# Patient Record
Sex: FEMALE | Race: WHITE | URBAN - METROPOLITAN AREA
[De-identification: names, ages, dates, MRNs, and addresses within clinical notes are randomized per-mention and may not be internally consistent; named-entity substitution may affect disease eponyms.]

---

## 2019-01-30 ENCOUNTER — OFFICE VISIT (OUTPATIENT)
Dept: FAMILY MEDICINE CLINIC | Age: 29
End: 2019-01-30
Payer: COMMERCIAL

## 2019-01-30 VITALS
OXYGEN SATURATION: 99 % | TEMPERATURE: 98.2 F | DIASTOLIC BLOOD PRESSURE: 68 MMHG | BODY MASS INDEX: 33.41 KG/M2 | SYSTOLIC BLOOD PRESSURE: 122 MMHG | WEIGHT: 170.2 LBS | HEART RATE: 82 BPM | HEIGHT: 60 IN

## 2019-01-30 DIAGNOSIS — N92.1 MENORRHAGIA WITH IRREGULAR CYCLE: ICD-10-CM

## 2019-01-30 DIAGNOSIS — N92.6 IRREGULAR PERIODS/MENSTRUAL CYCLES: Primary | ICD-10-CM

## 2019-01-30 DIAGNOSIS — E66.9 CLASS 1 OBESITY WITHOUT SERIOUS COMORBIDITY WITH BODY MASS INDEX (BMI) OF 33.0 TO 33.9 IN ADULT, UNSPECIFIED OBESITY TYPE: ICD-10-CM

## 2019-01-30 LAB
ANION GAP SERPL CALCULATED.3IONS-SCNC: 13 MMOL/L (ref 3–16)
BASOPHILS ABSOLUTE: 0 K/UL (ref 0–0.2)
BASOPHILS RELATIVE PERCENT: 0.4 %
BUN BLDV-MCNC: 11 MG/DL (ref 7–20)
CALCIUM SERPL-MCNC: 9.3 MG/DL (ref 8.3–10.6)
CHLORIDE BLD-SCNC: 101 MMOL/L (ref 99–110)
CO2: 25 MMOL/L (ref 21–32)
CREAT SERPL-MCNC: 0.9 MG/DL (ref 0.6–1.1)
EOSINOPHILS ABSOLUTE: 0.1 K/UL (ref 0–0.6)
EOSINOPHILS RELATIVE PERCENT: 1.9 %
GFR AFRICAN AMERICAN: >60
GFR NON-AFRICAN AMERICAN: >60
GLUCOSE BLD-MCNC: 90 MG/DL (ref 70–99)
HCT VFR BLD CALC: 41.3 % (ref 36–48)
HEMOGLOBIN: 13.7 G/DL (ref 12–16)
LYMPHOCYTES ABSOLUTE: 2.4 K/UL (ref 1–5.1)
LYMPHOCYTES RELATIVE PERCENT: 34.8 %
MCH RBC QN AUTO: 28.5 PG (ref 26–34)
MCHC RBC AUTO-ENTMCNC: 33.2 G/DL (ref 31–36)
MCV RBC AUTO: 85.8 FL (ref 80–100)
MONOCYTES ABSOLUTE: 0.5 K/UL (ref 0–1.3)
MONOCYTES RELATIVE PERCENT: 7 %
NEUTROPHILS ABSOLUTE: 3.8 K/UL (ref 1.7–7.7)
NEUTROPHILS RELATIVE PERCENT: 55.9 %
PDW BLD-RTO: 13.2 % (ref 12.4–15.4)
PLATELET # BLD: 280 K/UL (ref 135–450)
PMV BLD AUTO: 8.4 FL (ref 5–10.5)
POTASSIUM SERPL-SCNC: 4.3 MMOL/L (ref 3.5–5.1)
RBC # BLD: 4.82 M/UL (ref 4–5.2)
SODIUM BLD-SCNC: 139 MMOL/L (ref 136–145)
TSH REFLEX: 1.42 UIU/ML (ref 0.27–4.2)
WBC # BLD: 6.9 K/UL (ref 4–11)

## 2019-01-30 PROCEDURE — 99203 OFFICE O/P NEW LOW 30 MIN: CPT | Performed by: FAMILY MEDICINE

## 2019-01-30 PROCEDURE — 36415 COLL VENOUS BLD VENIPUNCTURE: CPT | Performed by: FAMILY MEDICINE

## 2019-01-30 ASSESSMENT — PATIENT HEALTH QUESTIONNAIRE - PHQ9
2. FEELING DOWN, DEPRESSED OR HOPELESS: 0
1. LITTLE INTEREST OR PLEASURE IN DOING THINGS: 0
SUM OF ALL RESPONSES TO PHQ9 QUESTIONS 1 & 2: 0
SUM OF ALL RESPONSES TO PHQ QUESTIONS 1-9: 0
SUM OF ALL RESPONSES TO PHQ QUESTIONS 1-9: 0

## 2019-01-30 ASSESSMENT — ENCOUNTER SYMPTOMS
EYE PAIN: 0
ABDOMINAL PAIN: 0
EYE DISCHARGE: 0
WHEEZING: 0
CHEST TIGHTNESS: 0
VOMITING: 0
EYE REDNESS: 0
SHORTNESS OF BREATH: 0
DIARRHEA: 0
RHINORRHEA: 0
BLOOD IN STOOL: 0
COUGH: 0
CONSTIPATION: 0
NAUSEA: 0
SINUS PRESSURE: 0

## 2019-02-01 ENCOUNTER — OFFICE VISIT (OUTPATIENT)
Dept: OBGYN CLINIC | Age: 29
End: 2019-02-01
Payer: COMMERCIAL

## 2019-02-01 VITALS
HEART RATE: 95 BPM | TEMPERATURE: 97.5 F | HEIGHT: 59 IN | SYSTOLIC BLOOD PRESSURE: 118 MMHG | DIASTOLIC BLOOD PRESSURE: 72 MMHG | WEIGHT: 176 LBS | BODY MASS INDEX: 35.48 KG/M2

## 2019-02-01 DIAGNOSIS — N92.6 IRREGULAR MENSES: ICD-10-CM

## 2019-02-01 DIAGNOSIS — Z01.419 ENCOUNTER FOR ANNUAL ROUTINE GYNECOLOGICAL EXAMINATION: Primary | ICD-10-CM

## 2019-02-01 DIAGNOSIS — Z12.4 PAP SMEAR FOR CERVICAL CANCER SCREENING: ICD-10-CM

## 2019-02-01 PROBLEM — B01.89 VARICELLA WITH COMPLICATIONS: Status: ACTIVE | Noted: 2019-02-01

## 2019-02-01 PROCEDURE — 99385 PREV VISIT NEW AGE 18-39: CPT | Performed by: OBSTETRICS & GYNECOLOGY

## 2019-02-01 PROCEDURE — 36415 COLL VENOUS BLD VENIPUNCTURE: CPT | Performed by: OBSTETRICS & GYNECOLOGY

## 2019-02-01 RX ORDER — NORETHINDRONE ACETATE AND ETHINYL ESTRADIOL .03; 1.5 MG/1; MG/1
1 TABLET ORAL DAILY
Qty: 21 TABLET | Refills: 2 | Status: SHIPPED | OUTPATIENT
Start: 2019-02-01 | End: 2019-11-14

## 2019-02-01 ASSESSMENT — ENCOUNTER SYMPTOMS
DIARRHEA: 0
CHEST TIGHTNESS: 0
VOMITING: 0
ABDOMINAL PAIN: 0
CONSTIPATION: 0
NAUSEA: 0
SHORTNESS OF BREATH: 0

## 2019-02-02 LAB — PROLACTIN: 30.7 NG/ML

## 2019-02-05 LAB
SEX HORMONE BINDING GLOBULIN: 36 NMOL/L (ref 30–135)
TESTOSTERONE FREE-NONMALE: 3.9 PG/ML (ref 0.8–7.4)
TESTOSTERONE TOTAL: 23 NG/DL (ref 20–70)

## 2019-02-06 LAB — 17-OH PROGESTERONE LCMS: 219.73 NG/DL

## 2019-10-11 ENCOUNTER — TELEPHONE (OUTPATIENT)
Dept: FAMILY MEDICINE CLINIC | Age: 29
End: 2019-10-11
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
## 2019-11-14 ENCOUNTER — OFFICE VISIT (OUTPATIENT)
Dept: FAMILY MEDICINE CLINIC | Age: 29
End: 2019-11-14
Payer: COMMERCIAL

## 2019-11-14 VITALS
HEIGHT: 59 IN | TEMPERATURE: 102.8 F | RESPIRATION RATE: 22 BRPM | BODY MASS INDEX: 35.44 KG/M2 | WEIGHT: 175.8 LBS | HEART RATE: 117 BPM | OXYGEN SATURATION: 98 % | DIASTOLIC BLOOD PRESSURE: 68 MMHG | SYSTOLIC BLOOD PRESSURE: 122 MMHG

## 2019-11-14 DIAGNOSIS — J10.1 INFLUENZA B: Primary | ICD-10-CM

## 2019-11-14 DIAGNOSIS — R68.89 FLU-LIKE SYMPTOMS: ICD-10-CM

## 2019-11-14 LAB
INFLUENZA A ANTIGEN, POC: NEGATIVE
INFLUENZA B ANTIGEN, POC: POSITIVE

## 2019-11-14 PROCEDURE — 87804 INFLUENZA ASSAY W/OPTIC: CPT | Performed by: PHYSICIAN ASSISTANT

## 2019-11-14 PROCEDURE — 99213 OFFICE O/P EST LOW 20 MIN: CPT | Performed by: PHYSICIAN ASSISTANT

## 2020-02-21 ENCOUNTER — OFFICE VISIT (OUTPATIENT)
Dept: OBGYN CLINIC | Age: 30
End: 2020-02-21
Payer: COMMERCIAL

## 2020-02-21 VITALS
RESPIRATION RATE: 16 BRPM | BODY MASS INDEX: 35.8 KG/M2 | DIASTOLIC BLOOD PRESSURE: 86 MMHG | HEART RATE: 101 BPM | WEIGHT: 177.6 LBS | SYSTOLIC BLOOD PRESSURE: 138 MMHG | HEIGHT: 59 IN

## 2020-02-21 PROCEDURE — 99385 PREV VISIT NEW AGE 18-39: CPT | Performed by: OBSTETRICS & GYNECOLOGY

## 2020-02-21 RX ORDER — NORGESTIMATE AND ETHINYL ESTRADIOL 7DAYSX3 28
1 KIT ORAL DAILY
Qty: 28 TABLET | Refills: 3 | Status: SHIPPED | OUTPATIENT
Start: 2020-02-21 | End: 2020-05-20

## 2020-02-21 ASSESSMENT — PATIENT HEALTH QUESTIONNAIRE - PHQ9
SUM OF ALL RESPONSES TO PHQ9 QUESTIONS 1 & 2: 0
SUM OF ALL RESPONSES TO PHQ QUESTIONS 1-9: 0
2. FEELING DOWN, DEPRESSED OR HOPELESS: 0
1. LITTLE INTEREST OR PLEASURE IN DOING THINGS: 0
SUM OF ALL RESPONSES TO PHQ QUESTIONS 1-9: 0

## 2020-02-21 NOTE — PROGRESS NOTES
[facility]   Annual Exam     CC:   Chief Complaint   Patient presents with    Gynecologic Exam    Discuss Medications     BC       HPI:  30 y.o. Viktoria Fischer presents for her gynecologic annual exam. Does admit to hx of OCP use, did not use for long due to very heavy periods (bleed at least for 3 months, whole time she was using). Health Maintenance:  Safe Enviroment: Y   Healthy diet: Y  Exercise: N   Sexually Active: Y    Denies sexual problems  Contraception: Condoms    - interested in birth control     Screening:  Last pap smear:  (Irais Eisenberg)    - has only had the one   History of abnormal pap smears: Denies   STI History: Denies      Vaccines:  Gardasil vaccine: Unsure, thinks so    Flu vaccine: N     Review of Systems:   Constitutional: Negative for appetite change, chills and fever. HENT: Negative for congestion, sneezing and sore throat. Eyes: Negative for visual disturbance. Respiratory: Negative for chest tightness, shortness of breath and wheezing. Cardiovascular: Negative for chest pain, palpitations and leg swelling. Gastrointestinal: Negative for abdominal pain, diarrhea, nausea and vomiting. Endocrine: Negative for heat intolerance. Genitourinary: Negative for difficulty urinating, dyspareunia, dysuria, menstrual problem and pelvic pain. Musculoskeletal: Negative for back pain, neck pain and neck stiffness. Skin: Negative for color change, pallor and rash. Allergic/Immunologic: Negative for environmental allergies, food allergies and immunocompromised state. Neurological: Negative for light-headedness, numbness and headaches. Hematological: Does not bruise/bleed easily. Psychiatric/Behavioral: Negative for behavioral problems, sleep disturbance and suicidal ideas.      Primary Care Physician: Frankey Bing BHATT, DO    Obstetric History  OB History    Para Term  AB Living   0 0 0 0 0 0   SAB TAB Ectopic Molar Multiple Live Births   0 0 0 0 0 0 Gynecologic History  Menstrual History:   LMP: Patient's last menstrual period was 02/09/2020 (approximate).  Menstrual Period: regular  o Has gotten more regular in past year   o Weight loss over    Interval Between Menses: 30 days    Duration of Menses: 2-3 days    Menstrual Flow: Very heavy then light    Bleeding between menses: rare, spotting between     MedicalHistory:  Past Medical History:   Diagnosis Date    Irregular periods     since the start of periods at age 16       Medications:  Current Outpatient Medications   Medication Sig Dispense Refill    Norgestim-Eth Estrad Triphasic (TRI-SPRINTEC) 0.18/0.215/0.25 MG-35 MCG TABS Take 1 tablet by mouth daily 28 tablet 3     No current facility-administered medications for this visit. Surgical History:  Past Surgical History:   Procedure Laterality Date    MOUTH SURGERY      took skin from top of mouth and placed on bottom gums       Allergies: Allergies   Allergen Reactions    Amoxicillin      HIVES    Penicillins Hives       Family History:  Family History   Problem Relation Age of Onset    High Blood Pressure Mother     Diabetes Mother         Type II    High Blood Pressure Father     Diabetes Father     High Blood Pressure Sister        Denies personal/family history of cervical, uterine, ovarian, vulvar, breast, or colon cancers. Mom had a hx with infertility      Social History:  Social History     Socioeconomic History    Marital status: Single     Spouse name: None    Number of children: None    Years of education: None    Highest education level: None   Occupational History    None   Social Needs    Financial resource strain: None    Food insecurity:     Worry: None     Inability: None    Transportation needs:     Medical: None     Non-medical: None   Tobacco Use    Smoking status: Never Smoker    Smokeless tobacco: Never Used   Substance and Sexual Activity    Alcohol use: Yes     Comment: occ    Drug use:  No  Sexual activity: Not Currently     Comment: never has had sex 2/1/19   Lifestyle    Physical activity:     Days per week: None     Minutes per session: None    Stress: None   Relationships    Social connections:     Talks on phone: None     Gets together: None     Attends Scientologist service: None     Active member of club or organization: None     Attends meetings of clubs or organizations: None     Relationship status: None    Intimate partner violence:     Fear of current or ex partner: None     Emotionally abused: None     Physically abused: None     Forced sexual activity: None   Other Topics Concern    None   Social History Narrative    None       Objective: Body mass index is 35.85 kg/m².   /86 (Site: Left Upper Arm, Position: Sitting, Cuff Size: Medium Adult)   Pulse 101   Resp 16   Ht 4' 11.02\" (1.499 m)   Wt 177 lb 9.6 oz (80.6 kg)   LMP 02/09/2020 (Approximate)   BMI 35.85 kg/m²     Exam:   General: Alert, well appearing, no acute distress  Head: Normocephalic, atraumatic  Mouth: Mucous membranes moist, pharynx normal without lesions  Thyroid: No thyromegaly or masses present   Breasts: Symmetric, non-tender to palpation, no skin changes, palpable axillary lymph nodes or masses noted  Lungs: Respiratory effort within normal limits   CV: Regular rate   Abdomen: Soft, nontender, nondistended   Pelvic:   External: Normal appearing genitalia without masses, tenderness or lesions  Vagina: moist, pink mucosa, without abnormal discharge or lesions  Cervix: no masses or lesions visualized, no cervical motion tenderness  Uterus: mobile, midline, no masses palpated  Adnexa: mobile, non-tender to palpation, without masses  Rectovaginal: deferred  Extremities: No redness or tenderness, neg Hesham's sign  Skin: Well perfused, normal coloration and turgor, no lesions or rashes visualized  Neuro: Alert, oriented, normal speech, no focal deficits, moves extremities appropriately  Osteopathic: No TART changes    Assessment/Plan:  30 y.o. Miguel Angel Gomez presenting for her annual exam:     Diagnosis Orders   1. Encounter for annual routine gynecological examination     2. Encounter for initial prescription of contraceptive pills     3. Irregular menses  Norgestim-Eth Estrad Triphasic (TRI-SPRINTEC) 0.18/0.215/0.25 MG-35 MCG TABS   4. Screening breast examination     5. Vaginal discharge  VAGINAL PATHOGENS PROBE *A    C.trachomatis N.gonorrhoeae DNA       Annual Visit Recommendations:   Clinical breast exam was done and self-breast exam and self-breast awareness reviewed. Pelvic exam was done and ASCCP guidelines reviewed   Reviewed healthy diet and daily multivitamin use. Reviewed recommendations on Calcium and Vitamin D intake. Discussed seatbelt use. Follow Up  - Will call patient with results   -Return in about 3 months (around 5/21/2020) for Recheck (OCP check).     Therese Evans DO

## 2020-02-22 LAB
CANDIDA SPECIES, DNA PROBE: NORMAL
GARDNERELLA VAGINALIS, DNA PROBE: NORMAL
TRICHOMONAS VAGINALIS DNA: NORMAL

## 2020-02-24 LAB
C TRACH DNA GENITAL QL NAA+PROBE: NEGATIVE
N. GONORRHOEAE DNA: NEGATIVE

## 2020-05-19 ENCOUNTER — TELEPHONE (OUTPATIENT)
Dept: OBGYN CLINIC | Age: 30
End: 2020-05-19

## 2020-05-20 RX ORDER — NORGESTIMATE AND ETHINYL ESTRADIOL 7DAYSX3 28
1 KIT ORAL DAILY
Qty: 28 TABLET | Refills: 1 | Status: SHIPPED | OUTPATIENT
Start: 2020-05-20 | End: 2020-08-17 | Stop reason: SDUPTHER

## 2020-05-20 NOTE — TELEPHONE ENCOUNTER
Rx signed for additional refill. Can we find out if patient has a way to check blood pressure at home? If so and she can check and let us know will not need in office OCP check at this time. Thanks.

## 2020-05-26 ENCOUNTER — NURSE ONLY (OUTPATIENT)
Dept: OBGYN CLINIC | Age: 30
End: 2020-05-26

## 2020-05-26 ENCOUNTER — TELEPHONE (OUTPATIENT)
Dept: OBGYN CLINIC | Age: 30
End: 2020-05-26

## 2020-05-26 VITALS — DIASTOLIC BLOOD PRESSURE: 78 MMHG | SYSTOLIC BLOOD PRESSURE: 121 MMHG | HEART RATE: 76 BPM

## 2020-08-17 ENCOUNTER — OFFICE VISIT (OUTPATIENT)
Dept: OBGYN CLINIC | Age: 30
End: 2020-08-17
Payer: COMMERCIAL

## 2020-08-17 VITALS
HEART RATE: 82 BPM | DIASTOLIC BLOOD PRESSURE: 84 MMHG | SYSTOLIC BLOOD PRESSURE: 124 MMHG | TEMPERATURE: 98.4 F | WEIGHT: 181 LBS | BODY MASS INDEX: 36.54 KG/M2

## 2020-08-17 PROCEDURE — 99212 OFFICE O/P EST SF 10 MIN: CPT | Performed by: OBSTETRICS & GYNECOLOGY

## 2020-08-17 RX ORDER — NORGESTIMATE AND ETHINYL ESTRADIOL 7DAYSX3 28
1 KIT ORAL DAILY
Qty: 28 TABLET | Refills: 7 | Status: SHIPPED | OUTPATIENT
Start: 2020-08-17 | End: 2021-02-26 | Stop reason: SDUPTHER

## 2020-08-17 NOTE — PROGRESS NOTES
Return Gyn Office Visit    CC:   Chief Complaint   Patient presents with    Medication Refill       HPI:  35 y.o. Jun Camilo who presents to office for OCP check. Overall doing well with the medication. Has had regular bleeding on it, LMP 7/20/2020, last 5-7d, moderate flow for 3 days, then light. No cramps, no clots. Moods have stabilized since the first few months on pill, initially was very luo but now much better. No headaches, chest pain, SOB. No intermenstrual spotting. Objective:  /84 (Site: Right Upper Arm, Position: Sitting, Cuff Size: Medium Adult)   Pulse 82   Temp 98.4 °F (36.9 °C) (Oral)   Wt 181 lb (82.1 kg)   LMP 07/20/2020   BMI 36.54 kg/m²   General: Alert, well appearing, no acute distress  CV: well perfused, RRR  Resp: nonlabored, CTAB  Abdomen: Soft, nontender, nondistended     Assessment/Plan  1. Encounter for surveillance of contraceptive pills  Doing well today, no issues and is happy with current pill. Discussed return precautions and warning signs/sx. Refills sent to pharmacy, all questions answered.       Dispo: PRN or for annual exam Feb 2021  Eder Hyde MD

## 2020-08-20 RX ORDER — NORGESTIMATE AND ETHINYL ESTRADIOL 7DAYSX3 28
KIT ORAL
Qty: 28 TABLET | Refills: 1 | OUTPATIENT
Start: 2020-08-20

## 2021-02-25 ENCOUNTER — TELEPHONE (OUTPATIENT)
Dept: OBGYN CLINIC | Age: 31
End: 2021-02-25

## 2021-02-26 ENCOUNTER — OFFICE VISIT (OUTPATIENT)
Dept: OBGYN CLINIC | Age: 31
End: 2021-02-26
Payer: COMMERCIAL

## 2021-02-26 VITALS
DIASTOLIC BLOOD PRESSURE: 86 MMHG | TEMPERATURE: 98.1 F | HEART RATE: 82 BPM | BODY MASS INDEX: 35.26 KG/M2 | HEIGHT: 60 IN | WEIGHT: 179.6 LBS | SYSTOLIC BLOOD PRESSURE: 128 MMHG

## 2021-02-26 DIAGNOSIS — Z30.41 ENCOUNTER FOR SURVEILLANCE OF CONTRACEPTIVE PILLS: ICD-10-CM

## 2021-02-26 DIAGNOSIS — Z01.419 ENCOUNTER FOR ANNUAL ROUTINE GYNECOLOGICAL EXAMINATION: Primary | ICD-10-CM

## 2021-02-26 PROCEDURE — 99395 PREV VISIT EST AGE 18-39: CPT | Performed by: OBSTETRICS & GYNECOLOGY

## 2021-02-26 RX ORDER — NORGESTIMATE AND ETHINYL ESTRADIOL 7DAYSX3 28
1 KIT ORAL DAILY
Qty: 28 TABLET | Refills: 12 | Status: SHIPPED | OUTPATIENT
Start: 2021-02-26 | End: 2022-03-15 | Stop reason: SDUPTHER

## 2021-02-26 ASSESSMENT — ENCOUNTER SYMPTOMS
SHORTNESS OF BREATH: 0
DIARRHEA: 0
ABDOMINAL PAIN: 0
NAUSEA: 0
VOMITING: 0
CONSTIPATION: 0

## 2021-02-26 NOTE — PROGRESS NOTES
Social History:  Social History     Socioeconomic History    Marital status: Single     Spouse name: None    Number of children: None    Years of education: None    Highest education level: None   Occupational History    None   Social Needs    Financial resource strain: None    Food insecurity     Worry: None     Inability: None    Transportation needs     Medical: None     Non-medical: None   Tobacco Use    Smoking status: Never Smoker    Smokeless tobacco: Never Used   Substance and Sexual Activity    Alcohol use: Yes     Comment: occ    Drug use: No    Sexual activity: Yes     Partners: Male   Lifestyle    Physical activity     Days per week: None     Minutes per session: None    Stress: None   Relationships    Social connections     Talks on phone: None     Gets together: None     Attends Pentecostal service: None     Active member of club or organization: None     Attends meetings of clubs or organizations: None     Relationship status: None    Intimate partner violence     Fear of current or ex partner: None     Emotionally abused: None     Physically abused: None     Forced sexual activity: None   Other Topics Concern    None   Social History Narrative    None       Family History:  Family History   Problem Relation Age of Onset    High Blood Pressure Mother     Diabetes Mother         Type II    High Blood Pressure Father     Diabetes Father     High Blood Pressure Sister      See above, denies personal/family history of cervical, uterine, ovarian, vulvar, breast, or colon cancers. Objective: Body mass index is 35.08 kg/m². /86 (Site: Right Upper Arm, Position: Sitting, Cuff Size: Medium Adult)   Pulse 82   Temp 98.1 °F (36.7 °C) (Infrared)   Ht 5' (1.524 m)   Wt 179 lb 9.6 oz (81.5 kg)   LMP 02/04/2021 (Approximate)   BMI 35.08 kg/m²     Exam:   Physical Exam  Exam conducted with a chaperone present. Constitutional:       Appearance: She is well-developed.    HENT: Head: Normocephalic and atraumatic. Neck:      Musculoskeletal: Normal range of motion. Cardiovascular:      Rate and Rhythm: Normal rate and regular rhythm. Pulmonary:      Effort: Pulmonary effort is normal. No respiratory distress. Chest:      Breasts:         Right: Normal. No mass, nipple discharge or skin change. Left: Normal. No mass, nipple discharge or skin change. Abdominal:      General: There is no distension. Palpations: Abdomen is soft. Tenderness: There is no abdominal tenderness. There is no guarding or rebound. Genitourinary:     Comments: Pelvic exam: VULVA: normal appearing vulva with no masses, tenderness or lesions, VAGINA: normal appearing vagina with normal color and discharge, no lesions, CERVIX: normal appearing cervix without discharge or lesions, UTERUS: uterus is normal size, shape, consistency and nontender, ADNEXA: normal adnexa in size, nontender and no masses. Neurological:      Mental Status: She is alert and oriented to person, place, and time. Assessment/Plan:  28 y.o. Kevin Ott presenting for her annual exam:    1. Encounter for annual routine gynecological examination  Discussed age appropriate screening recommendations, pap smear up-to-date (next Feb 2022). Discussed breast self awareness, STI screening deferred. 2. Encounter for surveillance of contraceptive pills  On OCP, happy with this method, no issues. Refills sent to pharmacy today, all questions answered.       Dispo: PRN or for annual exam  Digna Powers MD

## 2021-10-06 ENCOUNTER — TELEPHONE (OUTPATIENT)
Dept: OBGYN CLINIC | Age: 31
End: 2021-10-06

## 2021-10-06 DIAGNOSIS — R11.0 NAUSEA: Primary | ICD-10-CM

## 2021-10-06 NOTE — TELEPHONE ENCOUNTER
Patient called the office and stated she is having weird symptoms and feels she may need to have a blood hcg test done. Stated she has had 4 negative urine tests done. She is experiencing nausea, cramps, tender breasts, vomited yesterday, very tired, and is hungry all the time, and she is eating and then 2 hours later she has to eat again. Stated she is on the [facility] and is still taking the [facility] same as always. Last menstrual cycle was the end of last month lasted 3 days and was very light. These symptoms have been lasting about 3 to 4 weeks now. Please advise.      Routing to Dr. Tevin Simmons

## 2021-10-06 NOTE — TELEPHONE ENCOUNTER
Patient returned call to office. Verbalized understanding. No questions or concerns voiced. Pended lab/ scheduled lab visit.     routing to Dr. Tevin Simmons

## 2021-10-08 ENCOUNTER — NURSE ONLY (OUTPATIENT)
Dept: OBGYN CLINIC | Age: 31
End: 2021-10-08

## 2021-10-08 VITALS — WEIGHT: 194.4 LBS | BODY MASS INDEX: 37.97 KG/M2 | TEMPERATURE: 98.4 F | HEART RATE: 88 BPM

## 2021-10-08 DIAGNOSIS — R11.0 NAUSEA: ICD-10-CM

## 2021-10-08 LAB
REASON FOR REJECTION: NORMAL
REJECTED TEST: NORMAL

## 2021-10-11 ENCOUNTER — TELEPHONE (OUTPATIENT)
Dept: FAMILY MEDICINE CLINIC | Age: 31
End: 2021-10-11

## 2021-10-11 ENCOUNTER — TELEPHONE (OUTPATIENT)
Dept: INTERNAL MEDICINE CLINIC | Age: 31
End: 2021-10-11

## 2021-10-11 NOTE — TELEPHONE ENCOUNTER
Arleen Cantu called from [facility]. She had received a call from [facility] lab regarding HCG being rejected due to wrong tube was used.

## 2021-10-11 NOTE — TELEPHONE ENCOUNTER
Message was routed to wrong office, looks like blood work was drawn by her OB/Gyn, routing to that office.

## 2021-10-12 ENCOUNTER — TELEPHONE (OUTPATIENT)
Dept: OBGYN CLINIC | Age: 31
End: 2021-10-12

## 2021-10-12 DIAGNOSIS — R11.0 NAUSEA: Primary | ICD-10-CM

## 2021-10-12 NOTE — TELEPHONE ENCOUNTER
Patient had lab drawn on 10/8 per symptoms and Herbie Smith, lab wa rejected due to wrong tube. Patient rescheduled and new order pended in Epic for office draw on the 18th. Please advise.      Routing to Dr. Owen Nelson (on call)

## 2022-02-15 ENCOUNTER — TELEPHONE (OUTPATIENT)
Dept: FAMILY MEDICINE CLINIC | Age: 32
End: 2022-02-15

## 2022-02-15 NOTE — TELEPHONE ENCOUNTER
----- Message from Julio C Townsend sent at 2/15/2022 11:47 AM EST -----  Subject: Message to Provider    QUESTIONS  Information for Provider? Patient needs a form filled out for a physical   and wondered if she needs to make an appt for that first.   ---------------------------------------------------------------------------  --------------  [address]  What is the best way for the office to contact you? OK to leave message on   voicemail  Preferred Call Back Phone Number? [phone]  ---------------------------------------------------------------------------  --------------  SCRIPT ANSWERS  Relationship to Patient?  Self

## 2022-02-15 NOTE — TELEPHONE ENCOUNTER
Spoke to the patient and let her know she would need to make an appt for a physical, she states that she now lives in [place] and would call back when/if she wants to schedule

## 2022-02-16 ENCOUNTER — TELEPHONE (OUTPATIENT)
Dept: FAMILY MEDICINE CLINIC | Age: 32
End: 2022-02-16

## 2022-02-16 NOTE — TELEPHONE ENCOUNTER
LM on pt's VM stating that her immunization record will be up front for her when she gets here to pick it up.

## 2022-03-15 RX ORDER — NORGESTIMATE AND ETHINYL ESTRADIOL 7DAYSX3 28
1 KIT ORAL DAILY
Qty: 28 TABLET | Refills: 1 | Status: SHIPPED | OUTPATIENT
Start: 2022-03-15

## 2022-03-15 NOTE — TELEPHONE ENCOUNTER
Patient called and stated she is in need of a refill on her [facility]. Pended medication. Is aware she needs annual, stated she is getting her insurance in order and will call to schedule. Pended medication. Please advise.  Routing to Dr. Timoteo Farrell